# Patient Record
Sex: FEMALE | Race: WHITE | NOT HISPANIC OR LATINO | Employment: UNEMPLOYED | URBAN - METROPOLITAN AREA
[De-identification: names, ages, dates, MRNs, and addresses within clinical notes are randomized per-mention and may not be internally consistent; named-entity substitution may affect disease eponyms.]

---

## 2021-02-22 ENCOUNTER — TRANSFERRED RECORDS (OUTPATIENT)
Dept: HEALTH INFORMATION MANAGEMENT | Facility: CLINIC | Age: 56
End: 2021-02-22

## 2021-02-22 ENCOUNTER — TRANSCRIBE ORDERS (OUTPATIENT)
Dept: OTHER | Age: 56
End: 2021-02-22

## 2021-02-22 ENCOUNTER — MEDICAL CORRESPONDENCE (OUTPATIENT)
Dept: HEALTH INFORMATION MANAGEMENT | Facility: CLINIC | Age: 56
End: 2021-02-22

## 2021-02-22 DIAGNOSIS — G71.09 OCULOPHARYNGEAL MUSCULAR DYSTROPHY (H): ICD-10-CM

## 2021-02-22 DIAGNOSIS — H02.403 PTOSIS OF BOTH EYELIDS: Primary | ICD-10-CM

## 2021-02-25 NOTE — TELEPHONE ENCOUNTER
FUTURE VISIT INFORMATION      FUTURE VISIT INFORMATION:    Date: 5.21.21    Time: 10:00 AM    Location: [facility]  REFERRAL INFORMATION:    Referring provider:  Alden Mina    Referring providers clinic:  [facility]    Reason for visit/diagnosis: Ptosis of both eyelids, ref by Alden Mina, OD    RECORDS REQUESTED FROM:       Clinic name Comments Records Status Imaging Status   [facility] 2.22.21 Alden Mina Care Everywhere

## 2021-05-21 ENCOUNTER — OFFICE VISIT (OUTPATIENT)
Dept: OPHTHALMOLOGY | Facility: CLINIC | Age: 56
End: 2021-05-21
Attending: OPTOMETRIST
Payer: MEDICARE

## 2021-05-21 ENCOUNTER — PRE VISIT (OUTPATIENT)
Dept: OPHTHALMOLOGY | Facility: CLINIC | Age: 56
End: 2021-05-21

## 2021-05-21 DIAGNOSIS — G71.09 OCULOPHARYNGEAL MUSCULAR DYSTROPHY (H): ICD-10-CM

## 2021-05-21 DIAGNOSIS — G51.32 HEMIFACIAL SPASM OF LEFT SIDE OF FACE: Primary | ICD-10-CM

## 2021-05-21 DIAGNOSIS — H02.403 PTOSIS OF BOTH EYELIDS: ICD-10-CM

## 2021-05-21 DIAGNOSIS — Z11.59 ENCOUNTER FOR SCREENING FOR OTHER VIRAL DISEASES: ICD-10-CM

## 2021-05-21 PROCEDURE — 92285 EXTERNAL OCULAR PHOTOGRAPHY: CPT | Mod: GC | Performed by: OPHTHALMOLOGY

## 2021-05-21 PROCEDURE — 99204 OFFICE O/P NEW MOD 45 MIN: CPT | Mod: GC | Performed by: OPHTHALMOLOGY

## 2021-05-21 PROCEDURE — 92082 INTERMEDIATE VISUAL FIELD XM: CPT | Mod: GC | Performed by: OPHTHALMOLOGY

## 2021-05-21 RX ORDER — ATENOLOL 50 MG/1
TABLET ORAL
COMMUNITY
Start: 2021-04-02

## 2021-05-21 RX ORDER — DIMENHYDRINATE 50 MG
1 TABLET,CHEWABLE ORAL
COMMUNITY

## 2021-05-21 RX ORDER — CYCLOBENZAPRINE HCL 5 MG
5-10 TABLET ORAL
COMMUNITY
Start: 2020-01-29

## 2021-05-21 RX ORDER — CLOTRIMAZOLE 10 MG/1
LOZENGE ORAL
COMMUNITY
Start: 2020-10-12 | End: 2021-06-04

## 2021-05-21 RX ORDER — GLYCOPYRROLATE AND FORMOTEROL FUMARATE 9; 4.8 UG/1; UG/1
AEROSOL, METERED RESPIRATORY (INHALATION)
COMMUNITY
Start: 2021-04-02 | End: 2021-06-04

## 2021-05-21 RX ORDER — FLUTICASONE PROPIONATE 50 MCG
1 SPRAY, SUSPENSION (ML) NASAL
COMMUNITY
Start: 2021-04-02

## 2021-05-21 RX ORDER — AMLODIPINE BESYLATE 5 MG/1
TABLET ORAL
COMMUNITY
Start: 2021-02-02 | End: 2021-06-04

## 2021-05-21 RX ORDER — ALBUTEROL SULFATE 90 UG/1
AEROSOL, METERED RESPIRATORY (INHALATION)
COMMUNITY
Start: 2021-02-03 | End: 2021-06-04

## 2021-05-21 RX ORDER — SUMATRIPTAN 100 MG/1
100 TABLET, FILM COATED ORAL
COMMUNITY
Start: 2019-08-30

## 2021-05-21 RX ORDER — DOXAZOSIN 4 MG/1
TABLET ORAL
COMMUNITY
Start: 2020-06-17 | End: 2021-06-04

## 2021-05-21 RX ORDER — ACETAMINOPHEN 500 MG
1000 TABLET ORAL
COMMUNITY

## 2021-05-21 RX ORDER — LORATADINE 10 MG/1
TABLET ORAL
COMMUNITY
Start: 2021-04-02

## 2021-05-21 RX ORDER — DEXAMETHASONE 6 MG/1
TABLET ORAL
COMMUNITY
Start: 2021-02-02 | End: 2021-06-04

## 2021-05-21 RX ORDER — IPRATROPIUM BROMIDE AND ALBUTEROL SULFATE 2.5; .5 MG/3ML; MG/3ML
3 SOLUTION RESPIRATORY (INHALATION)
COMMUNITY
Start: 2020-03-13

## 2021-05-21 RX ORDER — ONDANSETRON 4 MG/1
4 TABLET, ORALLY DISINTEGRATING ORAL
COMMUNITY
Start: 2019-08-30

## 2021-05-21 RX ORDER — ALBUTEROL SULFATE 90 UG/1
1 AEROSOL, METERED RESPIRATORY (INHALATION)
COMMUNITY
Start: 2021-02-03 | End: 2022-02-08

## 2021-05-21 RX ORDER — ASCORBIC ACID 500 MG
TABLET ORAL
COMMUNITY
Start: 2021-02-02 | End: 2021-06-04

## 2021-05-21 RX ORDER — TRAMADOL HYDROCHLORIDE 50 MG/1
TABLET ORAL
COMMUNITY
Start: 2021-05-19

## 2021-05-21 RX ORDER — NICOTINE 21 MG/24HR
PATCH, TRANSDERMAL 24 HOURS TRANSDERMAL
COMMUNITY
Start: 2021-02-02 | End: 2021-06-04

## 2021-05-21 RX ORDER — SODIUM CHLORIDE 9 MG/ML
INJECTION, SOLUTION INTRAVENOUS CONTINUOUS
COMMUNITY
End: 2021-06-04

## 2021-05-21 RX ORDER — MAGNESIUM HYDROXIDE/ALUMINUM HYDROXICE/SIMETHICONE 120; 1200; 1200 MG/30ML; MG/30ML; MG/30ML
30 SUSPENSION ORAL
COMMUNITY

## 2021-05-21 ASSESSMENT — VISUAL ACUITY
METHOD: SNELLEN - LINEAR
OD_CC+: -1
OS_CC+: -3
OD_CC: 20/40
OS_CC: 20/25
CORRECTION_TYPE: GLASSES

## 2021-05-21 ASSESSMENT — TONOMETRY
OS_IOP_MMHG: 16
IOP_METHOD: ICARE
OD_IOP_MMHG: 14

## 2021-05-21 ASSESSMENT — LAGOPHTHALMOS
OD_LAGOPHTHALMOS: 0
OS_LAGOPHTHALMOS: 0

## 2021-05-21 ASSESSMENT — CONF VISUAL FIELD
METHOD: COUNTING FINGERS
OD_NORMAL: 1
OS_NORMAL: 1

## 2021-05-21 ASSESSMENT — LEVATOR FUNCTION
OS_LEVATOR: 10
OD_LEVATOR: 10

## 2021-05-21 ASSESSMENT — EXTERNAL EXAM - RIGHT EYE: OD_EXAM: NORMAL

## 2021-05-21 ASSESSMENT — SLIT LAMP EXAM - LIDS
COMMENTS: PTOSIS
COMMENTS: PTOSIS

## 2021-05-21 ASSESSMENT — PATIENT HEALTH QUESTIONNAIRE - PHQ9: SUM OF ALL RESPONSES TO PHQ QUESTIONS 1-9: 11

## 2021-05-21 ASSESSMENT — MARGIN REFLEX DISTANCE
OS_MRD1: -1
OD_MRD1: -1

## 2021-05-21 NOTE — NURSING NOTE
Chief Complaints and History of Present Illnesses   Patient presents with     Consult For     Ptosis     Chief Complaint(s) and History of Present Illness(es)     Consult For     Laterality: both eyes    Course: rapidly worsening    Associated symptoms: haloes, dryness and swelling (all four lids intermittently).  Negative for eye pain    Treatments tried: artificial tears    Pain scale: 0/10    Comments: Ptosis              Comments     Patient was referred by Dr Mina for ptosis of both upper lids.  Liya tells me that her upper lids have become very droopy over the past year.  Initially she could use her brows to lift her lids but now she uses her fingers at times to raise her lids.  Her vision seems poor due to the lid position.  She stopped driving and is no longer reading books due to her decreased vision.    She tells me that the left side of her face has paralysis.    Depression Response    Patient completed the PHQ-9 assessment for depression and scored >9? Yes  Question 9 on the PHQ-9 was positive for suicidality? No  Does patient have current mental health provider? No    Is this a virtual visit? No    I personally notified the following: visit provider               HARSH Mays 10:07 AM  May 21, 2021

## 2021-05-21 NOTE — PROGRESS NOTES
Chief Complaints and History of Present Illnesses   Patient presents with     Consult For     Ptosis     Chief Complaint(s) and History of Present Illness(es)     Consult For     In both eyes.  Since onset it is rapidly worsening.  Associated symptoms   include haloes, dryness and swelling (all four lids intermittently).    Negative for eye pain.  Treatments tried include artificial tears.  Pain   was noted as 0/10. Additional comments: Ptosis              Comments     Patient was referred by Dr Mina for ptosis of both upper lids.  Liya   tells me that her upper lids have become very droopy over the past year.    Initially she could use her brows to lift her lids but now she uses her   fingers at times to raise her lids.  Her vision seems poor due to the lid   position.  She stopped driving and is no longer reading books due to her   decreased vision.    She tells me that the left side of her face has paralysis.    Hannah Valdez, COT 10:07 AM  May 21, 2021                 FUNCTIONAL COMPLAINTS RELATED TO DROOPY EYELIDS/BROWS:  Liya Teague describes upper lids interfering with superior visual field and interfering with activities of daily living including reading, driving and watching television.     EXAM:       MRD1: Right eye -1   Left eye -1 (when relaxed)      VISUAL FIELD:  Right eye untaped:0 degrees Right eye taped:40 degrees  Left eye untaped:0 degrees Left eye taped:40 degrees    Right eye visual field improves by: 40 degrees  Left eye visual field improves by: 40 degrees                  Assessment & Plan     Liya Teague is a 55 year old female with the following diagnoses:   1. Hemifacial spasm of left side of face    2. Ptosis of both eyelids    3. Oculopharyngeal muscular dystrophy (H)       Referral for ptosis in setting of oculopharyngeal muscular dystrophy by Dr. Alden Mina O.D.  On exam, significant ptosis each eye with decent levator function (~10mm), with MRD1 -1 each eye.   Would benefit from ptosis repair.  Notably, she has significant left-sided hemifacial spasm vs synkinesis.  She reports that in her 40's she was told she had Bell's palsy, which would favor synkinesis but the history is not clear - she reports not having had significant facial weakness.  Consider hemifacial spasm as well especially given the continuous spasm which does not worsen with smile/pucker etc.    PLAN:  - Bilateral upper lids ptosis repair via levator resection  - will get prior auth for Botox for left hemifacial spasm vs synkinesis  - MRI CN7 protocol would like in [place]       Ovidio Malloy MD, MPH  Oculoplastics Fellow    Attending Physician Attestation:  Complete documentation of historical and exam elements from today's encounter can be found in the full encounter summary report (not reduplicated in this progress note).  I personally obtained the chief complaint(s) and history of present illness.  I confirmed and edited as necessary the review of systems, past medical/surgical history, family history, social history, and examination findings as documented by others; and I examined the patient myself.  I personally reviewed the relevant tests, images, and reports as documented above.  I formulated and edited as necessary the assessment and plan and discussed the findings and management plan with the patient and family. I personally reviewed the ophthalmic test(s) associated with this encounter, agree with the interpretation(s) as documented by the resident/fellow, and have edited the corresponding report(s) as necessary.   -Tavares Muñoz MD        Today with Liya Teague, I reviewed the indications, risks, benefits, and alternatives of the proposed surgical procedure including, but not limited to, failure obtain the desired result  and need for additional surgery, bleeding, infection, loss of vision, loss of the eye, and the remote possibility of permanent damage to any organ system or  death with the use of anesthesia.  I provided multiple opportunities for the questions, answered all questions to the best of my ability, and confirmed that my answers and my discussion were understood.     - Tavares Muñoz MD 11:17 AM 5/21/2021

## 2021-05-21 NOTE — PATIENT INSTRUCTIONS
"Ptosis (Drooping Eyelids)    Eyelid ptosis (pronounced \"teddy-sis\") is a condition in which the upper eyelid droops or sags. It can affect one or both eyes. Sometimes the eyelid droops enough to obstruct the upper field of vision and/or side vision, requiring correction.??Ptosis Repair is a surgical procedure that can correct drooping eyelid(s). Depending upon the degree and cause, repair involves either resection (shortening) of a muscle in the eyelid or suspension with a muscle of the brow. Typically, the levator muscle (the major muscle responsible for elevating the upper eyelid) is shortened though an incision made along the natural crease of the lid. Excess skin weighing down the eyelid may also be removed.     Congenital Ptosis  Present from birth, the most common cause of congenital ptosis is the improper development of the levator muscle. Children may need tilt their head back or lift their eyelid with a finger to see. They may also develop amblyopia (\"lazy eye\"), strabismus (eyes that are not properly aligned), astigmatism, or blurred vision. Repair for mild to moderate congenital ptosis is generally performed between ages 3 and 5. Severe visual obstruction may require earlier treatment. ??Repair is usually performed in an outpatient surgical facility under general anesthesia so the child will not become anxious or restless during the procedure.     Acquired Ptosis  Most commonly due to age-related weakening of the levator muscle, acquired ptosis may also be caused by injury, trauma, or procedures, such as cataract surgery, which can cause weak tendons to stretch. Acquired ptosis may also be the first sign of some diseases, such as myasthenia gravis (a disorder in which the muscles become weak), or Juan's syndrome (a neurological condition that indicates injury to part of the sympathetic nervous system).? Ptosis Repair is usually performed in an outpatient surgical facility under anesthesia that induces a " "\"twilight\" state. Sedated consciousness is preferred so that Dr. Muñoz can accurately adjust the eyelids.     Who Should Perform The Surgery?   When choosing a surgeon to perform ptosis surgery, look for a cosmetic and reconstructive surgeon who specializes in the eyelids, orbit, and tear drain system. Dr. Muñoz's membership in the American Society of Ophthalmic Plastic and Reconstructive Surgery (ASOPRS) indicates he or she is not only a board certified ophthalmologist who knows the anatomy and structure of the eyelids and orbit, but also has had extensive training in ophthalmic plastic reconstructive and cosmetic surgery.    "

## 2021-05-21 NOTE — LETTER
2021         RE:  :  MRN: Liya Teague  1965  [id number]     Dear Dr. Alden Mina,    Thank you for asking me to see your patient, Liya Teague, for an oculoplastic   consultation.  My assessment and plan are below.  For further details, please see my attached clinic note.        Assessment & Plan     Liya Teague is a 55 year old female with the following diagnoses:   1. Hemifacial spasm of left side of face    2. Ptosis of both eyelids    3. Oculopharyngeal muscular dystrophy (H)       Referral for ptosis in setting of oculopharyngeal muscular dystrophy by Dr. Alden Mina O.D.  On exam, significant ptosis each eye with decent levator function (~10mm), with MRD1 -1 each eye.  Would benefit from ptosis repair.  Notably, she has significant left-sided hemifacial spasm vs synkinesis.  She reports that in her 40's she was told she had Bell's palsy, which would favor synkinesis but the history is not clear - she reports not having had significant facial weakness.  Consider hemifacial spasm as well especially given the continuous spasm which does not worsen with smile/pucker etc.    PLAN:  - Bilateral upper lids ptosis repair via levator resection  - will get prior auth for Botox for left hemifacial spasm vs synkinesis  - MRI CN7 protocol would like in [place]          Again, thank you for allowing me to participate in the care of your patient.      Sincerely,    Tavares Muñoz MD  Department of Ophthalmology and Visual Neurosciences  [facility]    CC: Alden Mina, EDWIN  [address] [place] [address]  Via Outside Provider Messaging     Christine Brown MD  [place] [address] [address] [facility] [place] [address]  Via Fax: [phone]

## 2021-05-26 ENCOUNTER — TELEPHONE (OUTPATIENT)
Dept: OPHTHALMOLOGY | Facility: CLINIC | Age: 56
End: 2021-05-26

## 2021-05-26 NOTE — TELEPHONE ENCOUNTER
Spoke with patient to schedule surgery with Dr. Muñoz    Surgery was scheduled on 6/4 at [facility]  Patient will have H&P at [facility] [place]     Patient is aware a COVID-19 test is needed before their procedure. The test should be with-in 4 days of their procedure.   Test Details: Date 6/2 Location [facility] [place]    Post-Op visit was scheduled on  6/18  Patient is aware a / is needed day of surgery.   Surgery packet was mailed 5/26, patient has my direct contact information for any further questions.

## 2021-06-01 ENCOUNTER — TRANSFERRED RECORDS (OUTPATIENT)
Dept: HEALTH INFORMATION MANAGEMENT | Facility: CLINIC | Age: 56
End: 2021-06-01

## 2021-06-03 ENCOUNTER — ANESTHESIA EVENT (OUTPATIENT)
Dept: SURGERY | Facility: AMBULATORY SURGERY CENTER | Age: 56
End: 2021-06-03
Payer: MEDICARE

## 2021-06-04 ENCOUNTER — TELEPHONE (OUTPATIENT)
Dept: OPHTHALMOLOGY | Facility: CLINIC | Age: 56
End: 2021-06-04

## 2021-06-04 ENCOUNTER — HOSPITAL ENCOUNTER (OUTPATIENT)
Facility: AMBULATORY SURGERY CENTER | Age: 56
End: 2021-06-04
Attending: OPHTHALMOLOGY
Payer: MEDICARE

## 2021-06-04 ENCOUNTER — ANESTHESIA (OUTPATIENT)
Dept: SURGERY | Facility: AMBULATORY SURGERY CENTER | Age: 56
End: 2021-06-04
Payer: MEDICARE

## 2021-06-04 VITALS
BODY MASS INDEX: 24.75 KG/M2 | SYSTOLIC BLOOD PRESSURE: 120 MMHG | HEART RATE: 53 BPM | TEMPERATURE: 96 F | DIASTOLIC BLOOD PRESSURE: 69 MMHG | OXYGEN SATURATION: 98 % | WEIGHT: 145 LBS | RESPIRATION RATE: 16 BRPM | HEIGHT: 64 IN

## 2021-06-04 DIAGNOSIS — H02.403 PTOSIS OF BOTH EYELIDS: ICD-10-CM

## 2021-06-04 PROCEDURE — 67904 REPAIR EYELID DEFECT: CPT | Mod: RT

## 2021-06-04 RX ORDER — FENTANYL CITRATE 50 UG/ML
25-50 INJECTION, SOLUTION INTRAMUSCULAR; INTRAVENOUS
Status: DISCONTINUED | OUTPATIENT
Start: 2021-06-04 | End: 2021-06-05 | Stop reason: HOSPADM

## 2021-06-04 RX ORDER — NALOXONE HYDROCHLORIDE 0.4 MG/ML
0.2 INJECTION, SOLUTION INTRAMUSCULAR; INTRAVENOUS; SUBCUTANEOUS
Status: DISCONTINUED | OUTPATIENT
Start: 2021-06-04 | End: 2021-06-05 | Stop reason: HOSPADM

## 2021-06-04 RX ORDER — MEPERIDINE HYDROCHLORIDE 25 MG/ML
12.5 INJECTION INTRAMUSCULAR; INTRAVENOUS; SUBCUTANEOUS
Status: DISCONTINUED | OUTPATIENT
Start: 2021-06-04 | End: 2021-06-05 | Stop reason: HOSPADM

## 2021-06-04 RX ORDER — KETOROLAC TROMETHAMINE 30 MG/ML
30 INJECTION, SOLUTION INTRAMUSCULAR; INTRAVENOUS EVERY 6 HOURS PRN
Status: DISCONTINUED | OUTPATIENT
Start: 2021-06-04 | End: 2021-06-05 | Stop reason: HOSPADM

## 2021-06-04 RX ORDER — NALOXONE HYDROCHLORIDE 0.4 MG/ML
0.4 INJECTION, SOLUTION INTRAMUSCULAR; INTRAVENOUS; SUBCUTANEOUS
Status: DISCONTINUED | OUTPATIENT
Start: 2021-06-04 | End: 2021-06-05 | Stop reason: HOSPADM

## 2021-06-04 RX ORDER — OXYCODONE HYDROCHLORIDE 5 MG/1
5 TABLET ORAL EVERY 6 HOURS PRN
Qty: 8 TABLET | Refills: 0 | Status: SHIPPED | OUTPATIENT
Start: 2021-06-04

## 2021-06-04 RX ORDER — DIMENHYDRINATE 50 MG/ML
25 INJECTION, SOLUTION INTRAMUSCULAR; INTRAVENOUS
Status: DISCONTINUED | OUTPATIENT
Start: 2021-06-04 | End: 2021-06-05 | Stop reason: HOSPADM

## 2021-06-04 RX ORDER — LIDOCAINE HYDROCHLORIDE AND EPINEPHRINE 10; 10 MG/ML; UG/ML
INJECTION, SOLUTION INFILTRATION; PERINEURAL PRN
Status: DISCONTINUED | OUTPATIENT
Start: 2021-06-04 | End: 2021-06-04 | Stop reason: HOSPADM

## 2021-06-04 RX ORDER — ALBUTEROL SULFATE 0.83 MG/ML
2.5 SOLUTION RESPIRATORY (INHALATION) EVERY 4 HOURS PRN
Status: DISCONTINUED | OUTPATIENT
Start: 2021-06-04 | End: 2021-06-04 | Stop reason: HOSPADM

## 2021-06-04 RX ORDER — LORAZEPAM 2 MG/ML
.5-1 INJECTION INTRAMUSCULAR
Status: DISCONTINUED | OUTPATIENT
Start: 2021-06-04 | End: 2021-06-04 | Stop reason: HOSPADM

## 2021-06-04 RX ORDER — LIDOCAINE 40 MG/G
CREAM TOPICAL
Status: DISCONTINUED | OUTPATIENT
Start: 2021-06-04 | End: 2021-06-04 | Stop reason: HOSPADM

## 2021-06-04 RX ORDER — SODIUM CHLORIDE, SODIUM LACTATE, POTASSIUM CHLORIDE, CALCIUM CHLORIDE 600; 310; 30; 20 MG/100ML; MG/100ML; MG/100ML; MG/100ML
INJECTION, SOLUTION INTRAVENOUS CONTINUOUS
Status: DISCONTINUED | OUTPATIENT
Start: 2021-06-04 | End: 2021-06-05 | Stop reason: HOSPADM

## 2021-06-04 RX ORDER — PROPOFOL 10 MG/ML
INJECTION, EMULSION INTRAVENOUS PRN
Status: DISCONTINUED | OUTPATIENT
Start: 2021-06-04 | End: 2021-06-04

## 2021-06-04 RX ORDER — ACETAMINOPHEN 325 MG/1
975 TABLET ORAL ONCE
Status: COMPLETED | OUTPATIENT
Start: 2021-06-04 | End: 2021-06-04

## 2021-06-04 RX ORDER — TETRACAINE HYDROCHLORIDE 5 MG/ML
SOLUTION OPHTHALMIC PRN
Status: DISCONTINUED | OUTPATIENT
Start: 2021-06-04 | End: 2021-06-04 | Stop reason: HOSPADM

## 2021-06-04 RX ORDER — ONDANSETRON 2 MG/ML
4 INJECTION INTRAMUSCULAR; INTRAVENOUS EVERY 30 MIN PRN
Status: DISCONTINUED | OUTPATIENT
Start: 2021-06-04 | End: 2021-06-05 | Stop reason: HOSPADM

## 2021-06-04 RX ORDER — SOFT LENS RINSE,STORE SOLUTION
2 SOLUTION, NON-ORAL MISCELLANEOUS 2 TIMES DAILY
COMMUNITY

## 2021-06-04 RX ORDER — ERYTHROMYCIN 5 MG/G
OINTMENT OPHTHALMIC PRN
Status: DISCONTINUED | OUTPATIENT
Start: 2021-06-04 | End: 2021-06-04 | Stop reason: HOSPADM

## 2021-06-04 RX ORDER — SODIUM CHLORIDE, SODIUM LACTATE, POTASSIUM CHLORIDE, CALCIUM CHLORIDE 600; 310; 30; 20 MG/100ML; MG/100ML; MG/100ML; MG/100ML
INJECTION, SOLUTION INTRAVENOUS CONTINUOUS
Status: DISCONTINUED | OUTPATIENT
Start: 2021-06-04 | End: 2021-06-04 | Stop reason: HOSPADM

## 2021-06-04 RX ORDER — FENTANYL CITRATE 50 UG/ML
INJECTION, SOLUTION INTRAMUSCULAR; INTRAVENOUS PRN
Status: DISCONTINUED | OUTPATIENT
Start: 2021-06-04 | End: 2021-06-04

## 2021-06-04 RX ORDER — ONDANSETRON 4 MG/1
4 TABLET, ORALLY DISINTEGRATING ORAL EVERY 30 MIN PRN
Status: DISCONTINUED | OUTPATIENT
Start: 2021-06-04 | End: 2021-06-05 | Stop reason: HOSPADM

## 2021-06-04 RX ORDER — BACITRACIN 500 [USP'U]/G
OINTMENT OPHTHALMIC
Qty: 3.5 G | Refills: 0 | Status: SHIPPED | OUTPATIENT
Start: 2021-06-04

## 2021-06-04 RX ORDER — OXYCODONE HYDROCHLORIDE 5 MG/1
5 TABLET ORAL EVERY 4 HOURS PRN
Status: DISCONTINUED | OUTPATIENT
Start: 2021-06-04 | End: 2021-06-05 | Stop reason: HOSPADM

## 2021-06-04 RX ORDER — HYDROMORPHONE HYDROCHLORIDE 1 MG/ML
.3-.5 INJECTION, SOLUTION INTRAMUSCULAR; INTRAVENOUS; SUBCUTANEOUS EVERY 10 MIN PRN
Status: DISCONTINUED | OUTPATIENT
Start: 2021-06-04 | End: 2021-06-05 | Stop reason: HOSPADM

## 2021-06-04 RX ORDER — FENTANYL CITRATE 50 UG/ML
25-50 INJECTION, SOLUTION INTRAMUSCULAR; INTRAVENOUS
Status: DISCONTINUED | OUTPATIENT
Start: 2021-06-04 | End: 2021-06-04 | Stop reason: HOSPADM

## 2021-06-04 RX ADMIN — SODIUM CHLORIDE, SODIUM LACTATE, POTASSIUM CHLORIDE, CALCIUM CHLORIDE: 600; 310; 30; 20 INJECTION, SOLUTION INTRAVENOUS at 09:30

## 2021-06-04 RX ADMIN — PROPOFOL 30 MG: 10 INJECTION, EMULSION INTRAVENOUS at 10:00

## 2021-06-04 RX ADMIN — SODIUM CHLORIDE, SODIUM LACTATE, POTASSIUM CHLORIDE, CALCIUM CHLORIDE: 600; 310; 30; 20 INJECTION, SOLUTION INTRAVENOUS at 09:57

## 2021-06-04 RX ADMIN — FENTANYL CITRATE 50 MCG: 50 INJECTION, SOLUTION INTRAMUSCULAR; INTRAVENOUS at 10:02

## 2021-06-04 RX ADMIN — ACETAMINOPHEN 975 MG: 325 TABLET ORAL at 09:30

## 2021-06-04 ASSESSMENT — MIFFLIN-ST. JEOR: SCORE: 1227.72

## 2021-06-04 ASSESSMENT — COPD QUESTIONNAIRES: COPD: 1

## 2021-06-04 NOTE — TELEPHONE ENCOUNTER
Spoke with patient regarding provider requested Telephone Visit due to Travel for patient. Patient was agreeable and thankful. Scheduled patient for Telephone Visit -2 wk po dos 6/4 bilateral upper eyelid ptosis repair in same time spot as previous appointment. Patient will upload photos to [employer] prior to appointment. Call patient at [phone] . Sent appointment letter to patient.  -Rescheduled Per Patient

## 2021-06-04 NOTE — ANESTHESIA POSTPROCEDURE EVALUATION
Patient: Liya Teague    Procedure(s):  bilateral upper eyelid ptosis repair    Diagnosis:Ptosis of both eyelids [H02.403]  Diagnosis Additional Information: No value filed.    Anesthesia Type:  MAC    Note:  Disposition: Outpatient   Postop Pain Control: Uneventful            Sign Out: Well controlled pain   PONV: No   Neuro/Psych: Uneventful            Sign Out: Acceptable/Baseline neuro status   Airway/Respiratory: Uneventful            Sign Out: Acceptable/Baseline resp. status   CV/Hemodynamics: Uneventful            Sign Out: Acceptable CV status; No obvious hypovolemia; No obvious fluid overload   Other NRE: NONE   DID A NON-ROUTINE EVENT OCCUR? No           Last vitals:  Vitals:    06/04/21 0835 06/04/21 1030   BP: (!) 145/88 120/69   Pulse: 53    Resp: 16 16   Temp: 36.3  C (97.3  F) 35.6  C (96  F)   SpO2: 100% 98%       Last vitals prior to Anesthesia Care Transfer:  CRNA VITALS  6/4/2021 0957 - 6/4/2021 1057      6/4/2021             Resp Rate (set):  10    EKG:  Sinus rhythm;Sinus bradycardia          Electronically Signed By: Elver Monzon MD, MD  June 4, 2021  12:04 PM  
No

## 2021-06-04 NOTE — OP NOTE
PREOPERATIVE DIAGNOSIS: Ptosis, bilateral upper lid.   POSTOPERATIVE DIAGNOSIS:  Ptosis,bilateral upper lid.   PROCEDURE:Bilateral  upper eyelid  ptosis repair by external levator resection.   SURGEON: Tavares Muñoz MD   ASSISTANT: Ovidio Malloy MD   ANESTHESIA: Monitored with local infiltration of 1% lidocaine with epinephrine [id number].   COMPLICATIONS: None.   ESTIMATED BLOOD LOSS: Less than 5 mL.   HISTORY: Liya Teague  presented with ptosis of bilateral upper lid interfering with the superior visual field and activities of daily living. After the risks, benefits and alternatives to the proposed procedure were explained, informed consent was obtained.   DESCRIPTION OF PROCEDURE: Liya Teague  was brought to the operating room and placed supine on the operating table. Intravenous sedation was given. The bilateral upper lid crease was marked with a marking pen and infiltrated with local anesthetic. The area was prepped and draped in the typical sterile ophthalmic fashion. Attention was directed to the right  side. A lid crease incision was made with a 15 blade and dissection carried down to the orbicularis with high temperature cautery. The orbital septum was opened horizontally. The levator aponeurosis was identified and dissected from the superior tarsal border and the underlying Perez's muscle and advanced with a 5-0 Mersilene suture to bring the lid into a normal height and contour. The suture was passed to partial thickness through the superior tarsal plate and then each end brought underneath the levator aponeurosis. The patient was asked to open the eye and the suture adjusted for height and contour.  The skin was closed with with running 6-0 plain gut sutures.  Ophthalmic ointment was applied to the incision. Attention was directed to the left side where the same procedure was performed. The patient tolerated the procedure well and left the operating room in stable condition.      KYLIE ALEGRIA MD

## 2021-06-04 NOTE — ANESTHESIA CARE TRANSFER NOTE
Patient: Liya Acosta Ho    Procedure(s):  bilateral upper eyelid ptosis repair    Diagnosis: Ptosis of both eyelids [H02.403]  Diagnosis Additional Information: No value filed.    Anesthesia Type:   No value filed.     Note:    Oropharynx: oropharynx clear of all foreign objects  Level of Consciousness: awake  Oxygen Supplementation: room air    Independent Airway: airway patency satisfactory and stable  Dentition: dentition unchanged  Vital Signs Stable: post-procedure vital signs reviewed and stable  Report to RN Given: handoff report given  Patient transferred to: Phase II    Handoff Report: Identifed the Patient, Identified the Reponsible Provider, Reviewed the pertinent medical history, Discussed the surgical course, Reviewed Intra-OP anesthesia mangement and issues during anesthesia, Set expectations for post-procedure period and Allowed opportunity for questions and acknowledgement of understanding      Vitals: (Last set prior to Anesthesia Care Transfer)  CRNA VITALS  6/4/2021 0957 - 6/4/2021 1032      6/4/2021             Resp Rate (set):  10        Electronically Signed By: ARTURO Dove CRNA  June 4, 2021  10:32 AM

## 2021-06-04 NOTE — DISCHARGE INSTRUCTIONS
Post-operative Instructions    Ophthalmic Plastic and Reconstructive Surgery  Tavares Muñoz M.D.  Ovidio Malloy M.D., M.P.H.    All instructions apply to the operated eye(s) or eyelid(s)    What to expect after surgery:    There will be some swelling, bruising, and likely a black eye (even into the lower eyelids and cheeks). Also expect crusting and discharge from the eye and/or incisions.     A small amount of surface bleeding is normal for the first 48 hours after surgery.    You may notice some bloody tears for the first few days after surgery. This is normal.    Your eye(s) and eyelid(s) may be painful and tender. This is normal after surgery. Use the pain medication as prescribed. If your pain does not improve despite the medication, contact the office.    Wound care and personal care:    If a patch or bandage has been placed, please leave this in place until seen in clinic. Prevent the bandage from getting wet.     Apply ice compresses 15 minutes on 15 minutes off while awake for the first 2 days after surgery, then switch to warm compresses 4 times a day until seen by your physician.     For warm packs you can place a cup of dry uncooked rice in a clean cotton sock. Place sock in microwave 30 seconds to one minute. Next place the warm sock into a plastic bag and wrap the bag with clean warm wet washcloth and place over operated eye.      You may shower or wash your hair the day after surgery. Do not bathe or go swimming for 1 week to prevent contamination of your wounds.    Do not apply make-up to the eyes or eyelids for 2 weeks after surgery.    Activity restrictions and driving:    Avoid heavy lifting, bending, exercise or strenuous activity for 1 week after surgery.    You may resume other activities and return to work as tolerated.    You may not resume driving until have you stopped using narcotic pain medications(such as Norco, Percocet, Tylenol #3).    Sinus Precautions for 1 week: Sneeze  with mouth open. Cough with mouth open. No blowing nose. No straws. No bending over.    Medications:    Restart all your regular home medications and eye drops today. If you take Plavix or Aspirin on a regular basis, wait for 3 days after your surgery before restarting these in order to decrease the risk of bleeding complications.    Avoid aspirin and aspirin-like medications (Motrin, Aleve, Ibuprofen, Sharon-Reader etc) for 5 days to reduce the risk of bleeding. You may take Tylenol (acetaminophen) for pain.    In addition to your home medications, take the following post-operative medications as prescribed by your physician:    Apply antibiotic ointment (erythromycin) to all sutures three times a day, and into the operated eye(s) at night.     Take scheduled extra strength Tylenol for pain.  You may take 1 to 2 pain pills (norco or oxycodone as prescribed) as needed for breakthrough pain up to every 6 hours.    The pain pills may make you drowsy. You must not drive a car, operate heavy machinery or drink alcohol while taking them.    The pain pills may cause constipation and nausea. Take them with some food to prevent a stomach upset. If you continue to experience nausea, call your physician.      WARNING: All the prescription pain medications listed above contain Tylenol (acetaminophen). You must not take more than 4,000 mg of acetaminophen per 24-hour period. This is equivalent to 6 tablets of Darvocet, 8 tablets of Vicodin, or 12 tablets of Norco, Percocet or Tylenol #3. If you take other over-the-counter medications containing acetaminophen, you must take the amount of acetaminophen into account and reduce the number of prescribed pain pills accordingly.    Contact information and follow-up:    Return to the Eye Clinic for a follow-up appointment with your physician as  scheduled. If no appointment has been scheduled, call [phone] for an  appointment with Dr. Muñoz within 1 to 2 weeks from your date of  "surgery.  -     Please email a few photos of your eye(s) or other operative site(s) to [email or web address] prior to your follow up visit.      For severe pain, bleeding, or loss of vision, call the Eye Clinic at [phone].    After hours or on weekends and holidays, call [phone] and ask to speak with the ophthalmologist on call.    [facility]  Home Care Following Anesthesia  For 24 hours after surgery:  1. Get plenty of rest.  A responsible adult must stay with you for at least 24 hours after you leave the surgery center.  2. Do not drive or use heavy equipment.  If you have weakness or tingling, don't drive or use heavy equipment until this feeling goes away.   3. Do not drink alcohol.   4. Avoid strenuous or risky activities.  Ask for help when climbing stairs.  5. You may feel lightheaded.  IF so, sit for a few minutes before standing.  Have someone help you get up.   6. If you have nausea (feel sick to your stomach): Drink only clear liquids such as apple juice, ginger ale, broth or 7-Up.  Rest may also help.  Be sure to drink enough fluids.  Move to a regular diet as you feel able.   7. You may have a slight fever.  Call the doctor if your fever is over 100 F (37.7 C) (taken under the tongue) or lasts longer than 24 hours.  8. You may have a dry mouth, a sore throat, muscle aches or trouble sleeping. These should go away after 24 hours.  9. Do not make important or legal decisions.   10. It is recommended to avoid smoking.        Today you received a Marcaine or bupivacaine block to numb the nerves near your surgery site.  This is a block using local anesthetic or \"numbing\" medication injected around the nerves to anesthetize or \"numb\" the area supplied by those nerves.  This block is injected into the muscle layer near your surgical site.  The medication may numb the location where you had surgery for 6-18 hours, but may last up to 24 hours.  If your surgical " site is an arm or leg you should be careful with your affected limb, since it is possible to injure your limb without being aware of it due to the numbing.  Until full feeling returns, you should guard against bumping or hitting your limb, and avoid extreme hot or cold temperatures on the skin.  As the block wears off, the feeling will return as a tingling or prickly sensation near your surgical site.  You will experience more discomfort from your incision as the feeling returns.  You may want to take a pain pill (a narcotic or Tylenol if this was prescribed by your surgeon) when you start to experience mild pain before the pain beccomes more severe.  If your pain medications do not control your pain you should notifiy your surgeon.    Tips for taking pain medications  To get the best pain relief possible, remember these points:    Take pain medications as directed, before pain becomes severe.    Pain medication can upset your stomach: taking it with food may help.    Constipation is a common side effect of pain medication. Drink plenty of  fluids.    Eat foods high in fiber. Take a stool softener if recommended by your doctor or pharmacist.    Do not drink alcohol, drive or operate machinery while taking pain medications.    Ask about other ways to control pain, such as with heat, ice or relaxation.    Tylenol/Acetaminophen Consumption  To help encourage the safe use of acetaminophen, the makers of TYLENOL  have lowered the maximum daily dose for single-ingredient Extra Strength TYLENOL  (acetaminophen) products sold in the [place] from 8 pills per day (4,000 mg) to 6 pills per day (3,000 mg). The dosing interval has also changed from 2 pills every 4-6 hours to 2 pills every 6 hours.    If you feel your pain relief is insufficient, you may take Tylenol/Acetaminophen in addition to your narcotic pain medication.     Be careful not to exceed 3,000 mg of Tylenol/Acetaminophen in a 24 hour period from all sources.    If  you are taking extra strength Tylenol/acetaminophen (500 mg), the maximum dose is 6 tablets in 24 hours.    If you are taking regular strength acetaminophen (325 mg), the maximum dose is 9 tablets in 24 hours.    Call a doctor for any of the followin. Signs of infection (fever, growing tenderness at the surgery site, a large amount of drainage or bleeding, severe pain, foul-smelling drainage, redness, swelling).  2. It has been over 8 to 10 hours since surgery and you are still not able to urinate (pass water).  3. Headache for over 24 hours.  4. Numbness, tingling or weakness the day after surgery (if you had spinal anesthesia).  5. Signs of Covid-19 infection (temperature over 100 degrees, shortness of breath, cough, loss of taste/smell, generalized body aches, persistent headache, chills, sore throat, nausea/vomiting/diarrhea)  Your doctor is:  Dr. Tavares Muñoz, Ophthalmology: [phone]                    Or dial [phone] and ask for the resident on call for:  Ophthalmology  For emergency care, call the:  [facility]:  [phone] (TTY for hearing impaired: [phone])

## 2021-06-04 NOTE — ANESTHESIA PREPROCEDURE EVALUATION
"Anesthesia Pre-Procedure Evaluation    Patient: Liya Teague   MRN: [id number] : 1965        Preoperative Diagnosis: Ptosis of both eyelids [H02.403]   Procedure : Procedure(s):  bilateral upper eyelid ptosis repair     Past Medical History:   Diagnosis Date     Anxiety      B12 deficiency      Centrilobular emphysema (H)      Cervical dysplasia      Chronic bilateral low back pain      Factor V Leiden carrier (H)      Fatty liver      GERD (gastroesophageal reflux disease)      H/O unilateral nephrectomy      History of drug abuse (H)      Hypercholesteremia      Hypertension      Lung mass      Neuropathy      Oculopharyngeal muscular dystrophy (H)      Osteopenia of multiple sites      Other dysphagia      Ptosis      RLS (restless legs syndrome)      Tobacco dependence      Transaminitis       Past Surgical History:   Procedure Laterality Date     DILATION AND CURETTAGE       LOWER LEG SOFT TISSUE TUMOR EXCISION       NEPHRECTOMY       SURGICAL HISTORY OF -       kidney excised (right)     TONSILLECTOMY, ADENOIDECTOMY, COMBINED       TUBAL LIGATION       WISDOM TOOTH EXTRACTION        Allergies   Allergen Reactions     Bees      Other reaction(s): Angioedema (High)     Carbamazepine Anaphylaxis     reaction: became toxic quickly pt states, Verified in Meditech: Y, Severity in Meditech: S  Patient states \"I coded\"       Erythromycin Rash     Verified in Meditech: Y, Severity in Meditech: S       Trimethobenzamide Nausea and Vomiting     Verified in Meditech: Y, Severity in Meditech: S      Social History     Tobacco Use     Smoking status: Current Every Day Smoker     Packs/day: 1.50     Smokeless tobacco: Never Used   Substance Use Topics     Alcohol use: Not on file     Comment: occasional      Wt Readings from Last 1 Encounters:   21 65.8 kg (145 lb)        Anesthesia Evaluation   Pt has had prior anesthetic. Type: General.    No history of anesthetic complications       ROS/MED " HX  ENT/Pulmonary:     (+) COPD,     Neurologic:  - neg neurologic ROS     Cardiovascular:     (+) hypertension-----    METS/Exercise Tolerance:     Hematologic:  - neg hematologic  ROS     Musculoskeletal:  - neg musculoskeletal ROS     GI/Hepatic:     (+) GERD,     Renal/Genitourinary:  - neg Renal ROS     Endo:  - neg endo ROS     Psychiatric/Substance Use:  - neg psychiatric ROS     Infectious Disease:  - neg infectious disease ROS     Malignancy:       Other:            Physical Exam    Airway  airway exam normal           Respiratory Devices and Support         Dental  no notable dental history         Cardiovascular   cardiovascular exam normal          Pulmonary   pulmonary exam normal                OUTSIDE LABS:  CBC: No results found for: WBC, HGB, HCT, PLT  BMP: No results found for: NA, POTASSIUM, CHLORIDE, CO2, BUN, CR, GLC  COAGS: No results found for: PTT, INR, FIBR  POC: No results found for: BGM, HCG, HCGS  HEPATIC: No results found for: ALBUMIN, PROTTOTAL, ALT, AST, GGT, ALKPHOS, BILITOTAL, BILIDIRECT, ANTONELLA  OTHER: No results found for: PH, LACT, A1C, MEAGAN, PHOS, MAG, LIPASE, AMYLASE, TSH, T4, T3, CRP, SED    Anesthesia Plan    ASA Status:  2   NPO Status:  NPO Appropriate    Anesthesia Type: MAC.     - Reason for MAC: straight local not clinically adequate   Induction: Intravenous.   Maintenance: TIVA.        Consents    Anesthesia Plan(s) and associated risks, benefits, and realistic alternatives discussed. Questions answered and patient/representative(s) expressed understanding.     - Discussed with:  Patient         Postoperative Care    Pain management: Oral pain medications.   PONV prophylaxis: Ondansetron (or other 5HT-3), Dexamethasone or Solumedrol     Comments:                Elver Monzon MD, MD

## 2021-06-05 ENCOUNTER — TELEPHONE (OUTPATIENT)
Dept: OPHTHALMOLOGY | Facility: CLINIC | Age: 56
End: 2021-06-05

## 2021-06-05 NOTE — TELEPHONE ENCOUNTER
Telephone call to Liyaterence Acosta Ho    Doing well with no pain, good vision, and no bleeding. All questions were answered, she is doing well, and postoperative care was reviewed.  A postop appointment has been scheduled.    Ovidio Malloy MD

## 2021-06-18 ENCOUNTER — TELEPHONE (OUTPATIENT)
Dept: OPHTHALMOLOGY | Facility: CLINIC | Age: 56
End: 2021-06-18

## 2021-06-18 ENCOUNTER — VIRTUAL VISIT (OUTPATIENT)
Dept: OPHTHALMOLOGY | Facility: CLINIC | Age: 56
End: 2021-06-18
Payer: MEDICARE

## 2021-06-18 DIAGNOSIS — G51.32 CLONIC HEMIFACIAL SPASM, LEFT: Primary | ICD-10-CM

## 2021-06-18 DIAGNOSIS — Z98.890 POSTOPERATIVE EYE STATE: Primary | ICD-10-CM

## 2021-06-18 PROCEDURE — 99207 PR SERVICE NOT STAFFED W/SUPERV PROV: CPT | Performed by: OPHTHALMOLOGY

## 2021-06-18 NOTE — PROGRESS NOTES
Liya is a 55 year old who is being evaluated via a billable telephone visit.      What phone number would you like to be contacted at? Mobile  How would you like to obtain your AVS? MyChart    Assessment & Plan     Postoperative eye state  Liya Teague is 2 weeks status post upper blepharoplasty  The incision(s) are healing well.  The lid(s) are in excellent position.    I have recommended:  * Continue antibiotic ointment or bland lubricating ointment (eg vaseline or aquaphor) to the incision site BID  * Massage along the incision BID  * Warm soaks QID until all edema and ecchymoses resolve  * Return to clinic in 6-8 weeks  * Will return to clinic for Botox for left hemifacial spasm at that time    Ovidio Malloy MD/MPH  Oculoplastics Fellow  6/18/2021 at 10:53 AM    Attending Physician Attestation: Complete documentation of historical and exam elements from today's encounter can be found in the full encounter summary report (not reduplicated in this progress note). I personally obtained the chief complaint(s) and history of present illness. I confirmed and edited as necessary the review of systems, past medical/surgical history, family history, social history, and examination findings as documented by others; and I examined the patient myself. I personally reviewed the relevant tests, images, and reports as documented above. I formulated and edited as necessary the assessment and plan and discussed the findings and management plan with the patient.  -Ovidio Malloy MD        Return in about 6 weeks (around 7/30/2021) for 6-8 weeks in clinic for Botox hemifacial spasm.    Tavares Muñoz MD  [facility] [place]    Franchesca Nickerson is a 55 year old who presents for follow-up after blepharoplasty.  She reports doing well overall, healing well, sutures starting to dissolve.  No concerns.  Ran out of erythromycin ointment.      Objective           Vitals:  No vitals  were obtained today due to virtual visit.           Phone call duration: 5 minutes    Attending Physician Attestation:  I did not speak with the patient, but I reviewed the case with the resident or fellow and edited the care plan as necessary.   -Tavares Muñoz MD

## 2021-06-18 NOTE — TELEPHONE ENCOUNTER
Spoke with patient regarding scheduling a Return in about 6 weeks (around 7/30/2021) for 6-8 weeks in clinic for Botox hemifacial spasm. RN for Provider ok 'ronald July 26th appointment date and was able to schedule patent accordingly. Sent appointment letter to confirmed email address.-Per Patient

## 2021-06-20 ENCOUNTER — HEALTH MAINTENANCE LETTER (OUTPATIENT)
Age: 56
End: 2021-06-20

## 2021-07-14 ENCOUNTER — TELEPHONE (OUTPATIENT)
Dept: OPHTHALMOLOGY | Facility: CLINIC | Age: 56
End: 2021-07-14

## 2021-07-15 ENCOUNTER — TELEPHONE (OUTPATIENT)
Dept: OPHTHALMOLOGY | Facility: CLINIC | Age: 56
End: 2021-07-15

## 2021-07-15 NOTE — TELEPHONE ENCOUNTER
Spoke with patient regarding rescheduling Botox Injection at patient's convenience. Patient rescheduled for after a current scheduled Surgery. Sent appointment reminder to confirmed address.-Per Patient

## 2021-10-11 ENCOUNTER — HEALTH MAINTENANCE LETTER (OUTPATIENT)
Age: 56
End: 2021-10-11

## 2021-10-15 ENCOUNTER — ALLIED HEALTH/NURSE VISIT (OUTPATIENT)
Dept: OPHTHALMOLOGY | Facility: CLINIC | Age: 56
End: 2021-10-15
Payer: MEDICARE

## 2021-10-15 DIAGNOSIS — G51.32 HEMIFACIAL SPASM OF LEFT SIDE OF FACE: Primary | ICD-10-CM

## 2021-10-15 PROCEDURE — 64612 DESTROY NERVE FACE MUSCLE: CPT | Mod: LT | Performed by: OPHTHALMOLOGY

## 2021-10-15 NOTE — PROGRESS NOTES
Diagnosis: LEFT Hemifacial spasm    After discussing the risks, benefits, alternatives and complications, the patient was given botulinum toxin A injections.  The patient tolerated the procedure well.  A total of  30 units were given as indicated on diagram.  No complications occurred.      Followup in  1 month with phone call follow up    Today with Liya Teague, I reviewed the indications, risks, benefits, and alternatives of the proposed injections including, but not limited to, failure obtain the desired result  and need for additional injections, diplopia, ptosis, change in facial expression, pain,  bleeding, infection, loss of vision, loss of the eye.  I provided multiple opportunities for the questions, answered all questions to the best of my ability, and confirmed that my answers and my discussion were understood.  I personally performed the injections.     - Tavares Muñoz MD 11:20 AM 10/15/2021

## 2021-10-27 ENCOUNTER — TELEPHONE (OUTPATIENT)
Dept: OPHTHALMOLOGY | Facility: CLINIC | Age: 56
End: 2021-10-27

## 2021-10-27 NOTE — TELEPHONE ENCOUNTER
"Spoke with patient regarding her request for emailed \"Before and After\" photos. We only have Before photos. Patient said she will just need the Before photos then please. -Per Patient  "

## 2021-11-19 ENCOUNTER — TELEPHONE (OUTPATIENT)
Dept: OPHTHALMOLOGY | Facility: CLINIC | Age: 56
End: 2021-11-19
Payer: MEDICARE

## 2021-11-19 NOTE — TELEPHONE ENCOUNTER
Spoke with patient regarding rescheduling missed Telephone appointment. Rescheduled accordingly and patient is aware of date and time. -Per Patient

## 2021-11-22 ENCOUNTER — TELEPHONE (OUTPATIENT)
Dept: OPHTHALMOLOGY | Facility: CLINIC | Age: 56
End: 2021-11-22

## 2021-11-22 ENCOUNTER — VIRTUAL VISIT (OUTPATIENT)
Dept: OPHTHALMOLOGY | Facility: CLINIC | Age: 56
End: 2021-11-22
Payer: MEDICARE

## 2021-11-22 DIAGNOSIS — G51.32 HEMIFACIAL SPASM OF LEFT SIDE OF FACE: Primary | ICD-10-CM

## 2021-11-22 PROCEDURE — 99212 OFFICE O/P EST SF 10 MIN: CPT | Mod: 95 | Performed by: OPHTHALMOLOGY

## 2021-11-22 NOTE — PROGRESS NOTES
Liya is a 56 year old who is being evaluated via a billable telephone visit.      What phone number would you like to be contacted at? mobile  How would you like to obtain your AVS? Kate Sorensen   Liya is a 56 year old who presents for the following health issues     HPI   S/p Botox 10/15/2021  Notes left lip drooping.   Eye is good.     Review of Systems   Constitutional, HEENT, cardiovascular, pulmonary, gi and gu systems are negative, except as otherwise noted.      Objective           Vitals:  No vitals were obtained today due to virtual visit.    Physical Exam   healthy, alert and no distress  PSYCH: Alert and oriented times 3; coherent speech, normal   rate and volume, able to articulate logical thoughts, able   to abstract reason, no tangential thoughts, no hallucinations   or delusions  Her affect is normal  RESP: No cough, no audible wheezing, able to talk in full sentences  Remainder of exam unable to be completed due to telephone visits                No chief complaint on file.    Chief Complaint(s) and History of Present Illness(es)     No visit information to display             Assessment & Plan     Liya Teague is a 56 year old female with the following diagnoses:   1. 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 Complete documentation of historical and exam elements from today's encounter can be found in the full encounter summary report (not reduplicated in this progress note).  I personally obtained the chief complaint(s) and history of present illness.  I confirmed and edited as necessary the review of systems, past medical/surgical history, family history, and social history.  I formulated and edited as necessary the assessment and plan and discussed the findings  and management plan with the patient and family.     -Tavares Muñoz MD        Phone call duration: 7 minutes

## 2021-11-22 NOTE — TELEPHONE ENCOUNTER
Spoke with patient regarding scheduling a Return to clinic 2 months for repeat injections move or leave out lip injections. Scheduled patient accordingly and sent reminder letter to confirmed address.-Per Patient

## 2022-01-18 ENCOUNTER — TELEPHONE (OUTPATIENT)
Dept: OPHTHALMOLOGY | Facility: CLINIC | Age: 57
End: 2022-01-18
Payer: MEDICARE

## 2022-01-18 NOTE — TELEPHONE ENCOUNTER
Spoke with patient regarding rescheduling appointment due to Provider out of clinic. Rescheduled patient accordingly and patient declined need of appointment reminder.-Per Patient

## 2022-02-07 ENCOUNTER — ALLIED HEALTH/NURSE VISIT (OUTPATIENT)
Dept: OPHTHALMOLOGY | Facility: CLINIC | Age: 57
End: 2022-02-07
Payer: MEDICARE

## 2022-02-07 DIAGNOSIS — G51.32 HEMIFACIAL SPASM OF LEFT SIDE OF FACE: Primary | ICD-10-CM

## 2022-02-07 PROCEDURE — 64612 DESTROY NERVE FACE MUSCLE: CPT | Mod: LT | Performed by: OPHTHALMOLOGY

## 2022-02-07 NOTE — PROGRESS NOTES
"Diagnosis: Blepharospasm     After discussing the risks, benefits, alternatives and complications, the patient was given botulinum toxin A injections.  The patient tolerated the procedure well.  A total of 20 units were given as indicated on diagram.  No complications occurred.       Followup in 3 months or as needed for recurrent spasms.    Today with Liya Teague, I reviewed the indications, risks, benefits, and alternatives of the proposed injections including, but not limited to, failure obtain the desired result  and need for additional injections, diplopia, ptosis, change in facial expression, pain,  bleeding, infection, loss of vision, loss of the eye.  I provided multiple opportunities for the questions, answered all questions to the best of my ability, and confirmed that my answers and my discussion were understood.  I personally performed the injections.     - Tavares Muñoz MD 7:56 AM 2/7/2022    Blepharospasm Severity    0 = None    1 = Minimal, increased blinking present only with external stimuli (i.e. Bright light, wind, reading, driving)    2 = Mild but spontaneous eyelid fluttering (without actual spasm), definitely noticeable, possible embarrassing, but not functionally disabling.    3 = Moderate, very noticeable spasm of eyelids only, mildly incapacitating    4 = Severe, incapacitating spasm of eyelids and possibly other facial muscles.    Score = 4    Blepharospasm Frequency    0 = None    1 = Slightly increased frequency of blinking    2 = Eyelid fluttering lasting less than 1 second in duration    3 = Eyelid spasm lasting more than 1 second, but eyes open more than 50% of the waking time.    4= Functionally \"blind\" due to persistent eye closure (blepharospasm) more than 50% of the waking time.    Score = 4    "

## 2022-05-09 ENCOUNTER — ALLIED HEALTH/NURSE VISIT (OUTPATIENT)
Dept: OPHTHALMOLOGY | Facility: CLINIC | Age: 57
End: 2022-05-09
Payer: MEDICARE

## 2022-05-09 DIAGNOSIS — G51.32 HEMIFACIAL SPASM OF LEFT SIDE OF FACE: Primary | ICD-10-CM

## 2022-05-09 PROCEDURE — 64612 DESTROY NERVE FACE MUSCLE: CPT | Mod: LT | Performed by: OPHTHALMOLOGY

## 2022-05-09 NOTE — PROGRESS NOTES
Diagnosis: LEFT Hemifacial spasm    After discussing the risks, benefits, alternatives and complications, the patient was given botulinum toxin A injections.  The patient tolerated the procedure well.  A total of  20 units were given as indicated on diagram.  No complications occurred.      Followup in 3 months or as needed for recurrent spasms.    Today with Liya Teague, I reviewed the indications, risks, benefits, and alternatives of the proposed injections including, but not limited to, failure obtain the desired result  and need for additional injections, diplopia, ptosis, change in facial expression, pain,  bleeding, infection, loss of vision, loss of the eye.  I provided multiple opportunities for the questions, answered all questions to the best of my ability, and confirmed that my answers and my discussion were understood.  I personally performed the injections.     - Tavares Muñoz MD 10:11 AM 5/9/2022

## 2022-07-17 ENCOUNTER — HEALTH MAINTENANCE LETTER (OUTPATIENT)
Age: 57
End: 2022-07-17

## 2022-09-25 ENCOUNTER — HEALTH MAINTENANCE LETTER (OUTPATIENT)
Age: 57
End: 2022-09-25

## 2023-08-05 ENCOUNTER — HEALTH MAINTENANCE LETTER (OUTPATIENT)
Age: 58
End: 2023-08-05

## 2024-07-07 ENCOUNTER — HEALTH MAINTENANCE LETTER (OUTPATIENT)
Age: 59
End: 2024-07-07

## 2024-09-15 ENCOUNTER — HEALTH MAINTENANCE LETTER (OUTPATIENT)
Age: 59
End: 2024-09-15

## (undated) DEVICE — PACK MINOR EYE CUSTOM ASC

## (undated) DEVICE — SOL WATER IRRIG 500ML BOTTLE 2F7113

## (undated) DEVICE — EYE PREP BETADINE 5% SOLUTION 30ML 0065-0411-30

## (undated) DEVICE — SU MERSILENE 5-0 S-24DA 18" 1761G

## (undated) DEVICE — PEN MARKING SKIN TYCO DEVON DUAL TIP 31145868

## (undated) DEVICE — LINEN TOWEL PACK X5 5464

## (undated) DEVICE — GLOVE PROTEXIS MICRO 7.5  2D73PM75

## (undated) DEVICE — ESU EYE HIGH TEMP 65410-183

## (undated) RX ORDER — ACETAMINOPHEN 325 MG/10.15ML
LIQUID ORAL
Status: DISPENSED
Start: 2021-06-04

## (undated) RX ORDER — FENTANYL CITRATE 50 UG/ML
INJECTION, SOLUTION INTRAMUSCULAR; INTRAVENOUS
Status: DISPENSED
Start: 2021-06-04

## (undated) RX ORDER — ACETAMINOPHEN 325 MG/1
TABLET ORAL
Status: DISPENSED
Start: 2021-06-04

## (undated) RX ORDER — PROPOFOL 10 MG/ML
INJECTION, EMULSION INTRAVENOUS
Status: DISPENSED
Start: 2021-06-04